# Patient Record
Sex: FEMALE | Race: WHITE | NOT HISPANIC OR LATINO | Employment: OTHER | URBAN - METROPOLITAN AREA
[De-identification: names, ages, dates, MRNs, and addresses within clinical notes are randomized per-mention and may not be internally consistent; named-entity substitution may affect disease eponyms.]

---

## 2023-01-20 ENCOUNTER — HOSPITAL ENCOUNTER (EMERGENCY)
Facility: HOSPITAL | Age: 63
Discharge: HOME | End: 2023-01-20
Attending: EMERGENCY MEDICINE

## 2023-01-20 VITALS
TEMPERATURE: 98.1 F | SYSTOLIC BLOOD PRESSURE: 145 MMHG | WEIGHT: 150 LBS | OXYGEN SATURATION: 100 % | HEART RATE: 95 BPM | RESPIRATION RATE: 18 BRPM | HEIGHT: 68 IN | BODY MASS INDEX: 22.73 KG/M2 | DIASTOLIC BLOOD PRESSURE: 63 MMHG

## 2023-01-20 DIAGNOSIS — R00.2 PALPITATIONS: Primary | ICD-10-CM

## 2023-01-20 LAB
ANION GAP SERPL CALCULATED.3IONS-SCNC: 13 MMOL/L (ref 4–13)
BASOPHILS # BLD AUTO: 0.05 THOUSANDS/ÂΜL (ref 0–0.1)
BASOPHILS NFR BLD AUTO: 1 % (ref 0–1)
BILIRUB UR QL STRIP: NEGATIVE
BUN SERPL-MCNC: 17 MG/DL (ref 5–25)
CALCIUM SERPL-MCNC: 10 MG/DL (ref 8.4–10.2)
CHLORIDE SERPL-SCNC: 99 MMOL/L (ref 96–108)
CLARITY UR: CLEAR
CO2 SERPL-SCNC: 24 MMOL/L (ref 21–32)
COLOR UR: YELLOW
CREAT SERPL-MCNC: 0.73 MG/DL (ref 0.6–1.3)
EOSINOPHIL # BLD AUTO: 0.15 THOUSAND/ÂΜL (ref 0–0.61)
EOSINOPHIL NFR BLD AUTO: 2 % (ref 0–6)
ERYTHROCYTE [DISTWIDTH] IN BLOOD BY AUTOMATED COUNT: 11.9 % (ref 11.6–15.1)
GFR SERPL CREATININE-BSD FRML MDRD: 87 ML/MIN/1.73SQ M
GLUCOSE SERPL-MCNC: 132 MG/DL (ref 65–140)
GLUCOSE UR STRIP-MCNC: NEGATIVE MG/DL
HCT VFR BLD AUTO: 40.1 % (ref 34.8–46.1)
HGB BLD-MCNC: 13.4 G/DL (ref 11.5–15.4)
HGB UR QL STRIP.AUTO: NEGATIVE
IMM GRANULOCYTES # BLD AUTO: 0.02 THOUSAND/UL (ref 0–0.2)
IMM GRANULOCYTES NFR BLD AUTO: 0 % (ref 0–2)
KETONES UR STRIP-MCNC: NEGATIVE MG/DL
LEUKOCYTE ESTERASE UR QL STRIP: NEGATIVE
LYMPHOCYTES # BLD AUTO: 2.26 THOUSANDS/ÂΜL (ref 0.6–4.47)
LYMPHOCYTES NFR BLD AUTO: 26 % (ref 14–44)
MAGNESIUM SERPL-MCNC: 1.8 MG/DL (ref 1.9–2.7)
MCH RBC QN AUTO: 31.9 PG (ref 26.8–34.3)
MCHC RBC AUTO-ENTMCNC: 33.4 G/DL (ref 31.4–37.4)
MCV RBC AUTO: 96 FL (ref 82–98)
MONOCYTES # BLD AUTO: 0.76 THOUSAND/ÂΜL (ref 0.17–1.22)
MONOCYTES NFR BLD AUTO: 9 % (ref 4–12)
NEUTROPHILS # BLD AUTO: 5.53 THOUSANDS/ÂΜL (ref 1.85–7.62)
NEUTS SEG NFR BLD AUTO: 62 % (ref 43–75)
NITRITE UR QL STRIP: NEGATIVE
NRBC BLD AUTO-RTO: 0 /100 WBCS
PH UR STRIP.AUTO: 8 [PH]
PLATELET # BLD AUTO: 228 THOUSANDS/UL (ref 149–390)
PMV BLD AUTO: 9.8 FL (ref 8.9–12.7)
POTASSIUM SERPL-SCNC: 3.4 MMOL/L (ref 3.5–5.3)
PROT UR STRIP-MCNC: NEGATIVE MG/DL
RBC # BLD AUTO: 4.2 MILLION/UL (ref 3.81–5.12)
SODIUM SERPL-SCNC: 136 MMOL/L (ref 135–147)
SP GR UR STRIP.AUTO: 1.01
TSH SERPL DL<=0.05 MIU/L-ACNC: 1.9 UIU/ML (ref 0.45–4.5)
UROBILINOGEN UR QL STRIP.AUTO: 0.2 E.U./DL
WBC # BLD AUTO: 8.77 THOUSAND/UL (ref 4.31–10.16)

## 2023-01-20 RX ORDER — CYCLOBENZAPRINE HCL 10 MG
10 TABLET ORAL 3 TIMES DAILY PRN
COMMUNITY

## 2023-01-20 RX ORDER — ATORVASTATIN CALCIUM 10 MG/1
10 TABLET, FILM COATED ORAL DAILY
COMMUNITY

## 2023-01-20 RX ORDER — LORAZEPAM 2 MG/ML
1 INJECTION INTRAMUSCULAR ONCE
Status: COMPLETED | OUTPATIENT
Start: 2023-01-20 | End: 2023-01-20

## 2023-01-20 RX ADMIN — MAGNESIUM OXIDE TAB 400 MG (241.3 MG ELEMENTAL MG) 800 MG: 400 (241.3 MG) TAB at 12:09

## 2023-01-20 NOTE — ED PROVIDER NOTES
History  Chief Complaint   Patient presents with   • Palpitations     Patient reports palpitations, shortness of breath  Patient reports this happened previously and it passed  HPI      Is a very pleasant, nontoxic, 11-year-old female presents emerged part via EMS secondary to palpitations the chief complaint  Patient was going from her house which is across from [facility] and driving up to the [facility] visit her mother in a an assisted living in the [place] area where she is developed palpitation  Patient describes it as if her "heart was pounding out of her chest" there was also associated shortness of breath with this  Patient pulled over try to take her pulse over her antecubital fossa area (patient is a retired nurse), felt as if her pulse was skipping a beat  No chest pain  G of syncope  Remote history of having thyroid dysfunction with cyst aspirated by [facility]  Unclear when the last time patient had any thyroid studies  Patient summoned 911, was hyperventilating had a carpopedal spasm along with periorbital numbness and tingling  After patient was administered oxygen via oxygen mask not hooked up to oxygen supply her hyperventilation improved  Patient's symptoms improved significantly after administration of Ativan by EMS  Prior to Admission Medications   Prescriptions Last Dose Informant Patient Reported? Taking?   atorvastatin (LIPITOR) 10 mg tablet   Yes Yes   Sig: Take 10 mg by mouth daily   conjugated estrogens (PREMARIN) 0 3 mg tablet   Yes Yes   Si 3 mg daily Patch   cyclobenzaprine (FLEXERIL) 10 mg tablet   Yes Yes   Sig: Take 10 mg by mouth 3 (three) times a day as needed for muscle spasms   progesterone (ENDOMETRIN) 100 MG vaginal insert   Yes Yes   Si mg daily      Facility-Administered Medications: None       History reviewed  No pertinent past medical history      Past Surgical History:   Procedure Laterality Date   • LAPAROSCOPY         History reviewed  No pertinent family history  I have reviewed and agree with the history as documented  E-Cigarette/Vaping     E-Cigarette/Vaping Substances     Social History     Tobacco Use   • Smoking status: Never   • Smokeless tobacco: Never   Substance Use Topics   • Alcohol use: Never   • Drug use: Never       Review of Systems   Constitutional: Negative  HENT: Negative  Eyes: Negative  Respiratory: Positive for shortness of breath  Negative for apnea, choking and chest tightness  Cardiovascular: Positive for palpitations  Negative for chest pain and leg swelling  Gastrointestinal: Negative  Endocrine: Negative  Genitourinary: Negative  Musculoskeletal: Negative  Skin: Negative  Allergic/Immunologic: Negative  Neurological: Negative  Hematological: Negative  Physical Exam  Physical Exam  Vitals and nursing note reviewed  Constitutional:       Appearance: Normal appearance  She is normal weight  HENT:      Head: Normocephalic and atraumatic  Right Ear: External ear normal       Left Ear: External ear normal       Nose: Nose normal       Mouth/Throat:      Mouth: Mucous membranes are moist       Pharynx: Oropharynx is clear  Eyes:      Extraocular Movements: Extraocular movements intact  Conjunctiva/sclera: Conjunctivae normal       Pupils: Pupils are equal, round, and reactive to light  Cardiovascular:      Rate and Rhythm: Normal rate and regular rhythm  Pulses: Normal pulses  Heart sounds: Normal heart sounds  Pulmonary:      Effort: Pulmonary effort is normal       Breath sounds: Normal breath sounds  Abdominal:      General: Abdomen is flat  Bowel sounds are normal    Musculoskeletal:         General: Normal range of motion  Cervical back: Normal range of motion  Skin:     General: Skin is warm  Capillary Refill: Capillary refill takes less than 2 seconds  Neurological:      General: No focal deficit present        Mental Status: She is alert and oriented to person, place, and time  Mental status is at baseline  Psychiatric:         Mood and Affect: Mood normal          Behavior: Behavior normal          Thought Content: Thought content normal          Judgment: Judgment normal          Vital Signs  ED Triage Vitals   Temperature Pulse Respirations Blood Pressure SpO2   01/20/23 1041 01/20/23 1041 01/20/23 1041 01/20/23 1041 01/20/23 1041   98 1 °F (36 7 °C) 104 (!) 25 144/79 100 %      Temp Source Heart Rate Source Patient Position - Orthostatic VS BP Location FiO2 (%)   01/20/23 1041 01/20/23 1215 01/20/23 1215 01/20/23 1215 --   Tympanic Monitor Sitting Left arm       Pain Score       01/20/23 1041       No Pain           Vitals:    01/20/23 1041 01/20/23 1215 01/20/23 1230   BP: 144/79 122/57 145/63   Pulse: 104 96 95   Patient Position - Orthostatic VS:  Sitting          Visual Acuity      ED Medications  Medications   magnesium oxide (MAG-OX) tablet 800 mg (800 mg Oral Given 1/20/23 1209)   LORazepam (FOR EMS ONLY) (ATIVAN) 2 mg/mL injection 2 mg (0 mg Does not apply Given to EMS 1/20/23 1053)       Diagnostic Studies  Results Reviewed     Procedure Component Value Units Date/Time    TSH [[id number]]  (Normal) Collected: 01/20/23 1107    Lab Status: Final result Specimen: Blood from Arm, Left Updated: 01/20/23 1152     TSH 3RD GENERATON 1 901 uIU/mL     Narrative:      Patients undergoing fluorescein dye angiography may retain small amounts of fluorescein in the body for 48-72 hours post procedure  Samples containing fluorescein can produce falsely depressed TSH values  If the patient had this procedure,a specimen should be resubmitted post fluorescein clearance        Basic metabolic panel [[id number]]  (Abnormal) Collected: 01/20/23 1107    Lab Status: Final result Specimen: Blood from Arm, Left Updated: 01/20/23 1136     Sodium 136 mmol/L      Potassium 3 4 mmol/L      Chloride 99 mmol/L      CO2 24 mmol/L      ANION GAP 13 mmol/L      BUN 17 mg/dL      Creatinine 0 73 mg/dL      Glucose 132 mg/dL      Calcium 10 0 mg/dL      eGFR 87 ml/min/1 73sq m     Narrative:      [place] guidelines for Chronic Kidney Disease (CKD):   •  Stage 1 with normal or high GFR (GFR > 90 mL/min/1 73 square meters)  •  Stage 2 Mild CKD (GFR = 60-89 mL/min/1 73 square meters)  •  Stage 3A Moderate CKD (GFR = 45-59 mL/min/1 73 square meters)  •  Stage 3B Moderate CKD (GFR = 30-44 mL/min/1 73 square meters)  •  Stage 4 Severe CKD (GFR = 15-29 mL/min/1 73 square meters)  •  Stage 5 End Stage CKD (GFR <15 mL/min/1 73 square meters)  Note: GFR calculation is accurate only with a steady state creatinine    Magnesium [[id number]]  (Abnormal) Collected: 01/20/23 1107    Lab Status: Final result Specimen: Blood from Arm, Left Updated: 01/20/23 1136     Magnesium 1 8 mg/dL     UA w Reflex to Microscopic w Reflex to Culture [[id number]]  (Abnormal) Collected: 01/20/23 1109    Lab Status: Final result Specimen: Urine, Clean Catch Updated: 01/20/23 1119     Color, UA Yellow     Clarity, UA Clear     Specific Gravity, UA 1 010     pH, UA 8 0     Leukocytes, UA Negative     Nitrite, UA Negative     Protein, UA Negative mg/dl      Glucose, UA Negative mg/dl      Ketones, UA Negative mg/dl      Urobilinogen, UA 0 2 E U /dl      Bilirubin, UA Negative     Occult Blood, UA Negative    CBC and differential [[id number]] Collected: 01/20/23 1107    Lab Status: Final result Specimen: Blood from Arm, Left Updated: 01/20/23 1118     WBC 8 77 Thousand/uL      RBC 4 20 Million/uL      Hemoglobin 13 4 g/dL      Hematocrit 40 1 %      MCV 96 fL      MCH 31 9 pg      MCHC 33 4 g/dL      RDW 11 9 %      MPV 9 8 fL      Platelets 338 Thousands/uL      nRBC 0 /100 WBCs      Neutrophils Relative 62 %      Immat GRANS % 0 %      Lymphocytes Relative 26 %      Monocytes Relative 9 %      Eosinophils Relative 2 %      Basophils Relative 1 %      Neutrophils Absolute 5 53 Thousands/µL      Immature Grans Absolute 0 02 Thousand/uL      Lymphocytes Absolute 2 26 Thousands/µL      Monocytes Absolute 0 76 Thousand/µL      Eosinophils Absolute 0 15 Thousand/µL      Basophils Absolute 0 05 Thousands/µL                  No orders to display              Procedures  ECG 12 Lead Documentation Only    Date/Time: 1/20/2023 10:59 AM  Performed by: Susy Camarillo DO  Authorized by: Nicole Rodriguez III, DO     Indications / Diagnosis:  Palpitations  ECG reviewed by me, the ED Provider: yes    Patient location:  ED  Comments:      I personally reviewed this EKG that was performed on the patient January 20, 2023, EKG was completed at 10:55 AM and interpreted by me at 10:59 AM, normal sinus rhythm with baseline artifact noted, incomplete right bundle branch block noted  Has of prolonged QT at 487 ms  No prior EKGs to compare to  ED Course  ED Course as of 01/20/23 1315   Fri Jan 20, 2023   1106 Patient seen and evaluated, prior history of anxiety, 26-year-old female presents emergency department with potation's and feels like her heart is pounding out of her chest, recently been under a large amount of stress related to the diminishing health of her mother who is currently in assisted living in the [place] area  See HPI for specifics (symptoms improved after Ativan), had carpopedal spasm along with periorbital tingling  Frontal diagnosis in this patient is as follows: Anxiety with hyperventilation vs  thyroid dysfunction: Prior Thyroid nodule  1137 Marginally low Magnesium: 1 8, will replace  1212 Patient reassessed  Exam consistent with palpitations may be underlying ventilation related to anxiety but does not have a formal diagnosis of anxiety disorder  Will refer to cardiology for possible Holter                                  SBIRT 20yo+    Flowsheet Row Most Recent Value   SBIRT (23 yo +)    In order to provide better care to our patients, we are screening all of our patients for alcohol and drug use  Would it be okay to ask you these screening questions? Yes Filed at: 01/20/2023 1113   Initial Alcohol Screen: US AUDIT-C     1  How often do you have a drink containing alcohol? 4 Filed at: 01/20/2023 1113   2  How many drinks containing alcohol do you have on a typical day you are drinking? 2 Filed at: 01/20/2023 1113   3a  Male UNDER 65: How often do you have five or more drinks on one occasion? 0 Filed at: 01/20/2023 1113   3b  FEMALE Any Age, or MALE 65+: How often do you have 4 or more drinks on one occassion? 0 Filed at: 01/20/2023 1113   Audit-C Score 6 Filed at: 01/20/2023 1113   ERNST: How many times in the past year have you    Used an illegal drug or used a prescription medication for non-medical reasons? Never Filed at: 01/20/2023 1113                    Medical Decision Making  Is a very pleasant, nontoxic-appearing, 59-year-old female presents to emergency department with chief complaint of palpitations described as "my heart is pounding out of my chest"  Patient arrived here with symptoms consistent with hyperventilation syndrome secondary to a full-blown anxiety attack  No formal diagnosis of anxiety, does have a history of thyroid dysfunction TSH normal  EKG within normal limits  Patient reports she is under huge amount of stress related to the recent health of her mother where she had multiple ER visits over the holidays with a subsequent admission and transfer to a [facility]  After Ativan symptoms improved, patient had normal pedal spasm, oral facial numbness with hyperventilation which improved with oxygen mask not being placed to wall oxygen        Anticipatory guidance given to the patient regarding need for self care, no arrhthymias on telemetry seen, 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 there was a pause, did not see this on the telemetry monitor, patient will have a monitor ordered as an outpatient and follow-up with cardiology  Patient stable at time of discharge  Portions of the record may have been created with voice recognition software  Occasional wrong word or "sound a like" substitutions may have occurred due to the inherent limitations of voice recognition software  Read the chart carefully and recognize, using context, where substitutions have occurred  Counseling: I had a detailed discussion with the patient and/or guardian regarding: the historical points, exam findings, and any diagnostic results supporting the discharge diagnosis, lab results, radiology results, discharge instructions reviewed with patient and/or family/caregiver and understanding was verbalized  Instructions given to return to the emergency department if symptoms worsen or persist, or if there are any questions or concerns that arise at home      This patient was examined during the Covid-19 pandemic, and appropriate PPE was employed as defined by OSHA to minimize exposure to the patient and to avoid spread in the event that I am an asymptomatic carrier  All efforts were made to avoid direct contact with the patient per CDC guidelines ("social distancing") unless otherwise necessary to rule out a medical emergency and/or to provide life-saving interventions  Donning and doffing of PPE was performed per recommended guidelines, and personal PPE was employed if /when institutional PPE was not readily available or was deemed to be less than the recommended as defined by OSHA  Palpitations: acute illness or injury  Amount and/or Complexity of Data Reviewed  Labs: ordered  ECG/medicine tests: ordered  Risk  OTC drugs  Prescription drug management            Disposition  Final diagnoses:   Palpitations     Time reflects when diagnosis was documented in both MDM as applicable and the Disposition within this note     Time User Action Codes Description Comment    1/20/2023 12:29 PM Art Hayden Add [R00 2] Palpitations       ED Disposition     ED Disposition   Discharge    Condition   Stable    Date/Time   Fri Jan 20, 2023 12:30 PM    Comment   Renan Paz discharge to home/self care  Follow-up Information     Follow up With Specialties Details Why [address] Blunt DO Cardiology   [facility]  Dena Banerjee [phone] [facility]            Discharge Medication List as of 1/20/2023 12:31 PM      CONTINUE these medications which have NOT CHANGED    Details   atorvastatin (LIPITOR) 10 mg tablet Take 10 mg by mouth daily, Historical Med      conjugated estrogens (PREMARIN) 0 3 mg tablet 0 3 mg daily Patch, Historical Med      cyclobenzaprine (FLEXERIL) 10 mg tablet Take 10 mg by mouth 3 (three) times a day as needed for muscle spasms, Historical Med      progesterone (ENDOMETRIN) 100 MG vaginal insert 100 mg daily, Historical Med             Outpatient Discharge Orders   Ambulatory Referral to Cardiology   Standing Status: Future Standing Exp  Date: 01/20/24      Holter monitor   Standing Status: Future Standing Exp   Date: 01/20/27       PDMP Review     None          ED Provider  Electronically Signed by           Domenico Varner III, DO  01/20/23 5880

## 2023-01-21 LAB
ATRIAL RATE: 100 BPM
P AXIS: 85 DEGREES
PR INTERVAL: 160 MS
QRS AXIS: 51 DEGREES
QRSD INTERVAL: 112 MS
QT INTERVAL: 378 MS
QTC INTERVAL: 487 MS
T WAVE AXIS: 77 DEGREES
VENTRICULAR RATE: 100 BPM

## 2023-01-24 PROBLEM — E78.2 MIXED HYPERLIPIDEMIA: Status: ACTIVE | Noted: 2022-11-22

## 2023-01-24 PROBLEM — E04.1 THYROID NODULE: Status: ACTIVE | Noted: 2018-06-21

## 2023-01-24 PROBLEM — M54.50 INTERMITTENT LOW BACK PAIN: Status: ACTIVE | Noted: 2019-07-10

## 2023-01-24 PROBLEM — D69.6 THROMBOCYTOPENIA (HCC): Status: ACTIVE | Noted: 2023-01-24

## 2023-01-24 PROBLEM — M20.20 ACQUIRED HALLUX RIGIDUS: Status: ACTIVE | Noted: 2021-04-14

## 2023-01-24 NOTE — PROGRESS NOTES
Cardiology Consultation     Kathryn Bryan  [id number]  1960  [facility] CARDIOLOGY [place]  [address] [id number]      1  Palpitations  Ambulatory Referral to Cardiology    AMB extended holter monitor    Echo complete w/ contrast if indicated      2  Mixed hyperlipidemia            Discussion/Summary:  Palpitations  -2 episodes of palpitations last week  - No concerning findings and EKG  - TSH WNL, and no other concerning findings on blood work  - Will check 2-week Zio patch, if negative, can consider repeat Zio patch, versus loop recorder versus Kardia mobile  - Instructed patient to call if she has frequent palpitations, can consider starting metoprolol at that time  - Also check TTE to rule out structural causes of her palpitations/chest pounding  Hyperlipidemia  - Continue with atorvastatin 10 mg daily  - Lipid panel 11/15/2022 showed total cholesterol 230, HDL 57, triglycerides 160,   -Cholesterol elevated, will continue to monitor for now    History of Present Illness:  Kathryn Bryan is a 60y o  year old female with a past medical history of hyperlipidemia  She presented to the emergency department on 1/20/2023 for palpitations and shortness of breath  Her symptoms improved after Ativan given by EMS  She reports initially feeling palpitations while driving to see her mother  Started initially with a fluttering sensation that lasted a few seconds followed by a pause and then reports feeling a strong pounding sensation in her chest   Denies any episodes of lightheadedness/dizziness or passing out with the palpitations or pounding  Does report feeling stiffness and numbness/tingling in her hands  She was unsure how to get to the nearest hospital, so she called EMS who brought her to the ED  She reports having similar episode 2 days prior at night when she woke up to use the bathroom  She reports she was helpful back to sleep and the symptoms went away    He also does report having 1 prior episode of palpitations about a year ago while cleaning her house, which also subsided on their own  Has any prior cardiac history  Reports having 1 cup of half caf coffee in the morning  Acknowledges drinking some water throughout the day, but probably less than recommended  Patient Active Problem List   Diagnosis   • Acquired hallux rigidus   • Intermittent low back pain   • Postmenopausal hormone replacement therapy   • Mixed hyperlipidemia   • Personal history of malignant neoplasm of skin   • Thyroid nodule   • Vaginal atrophy   • Thrombocytopenia ([facility] [address] )     History reviewed  No pertinent past medical history  Social History     Socioeconomic History   • Marital status: /Civil Union     Spouse name: Not on file   • Number of children: Not on file   • Years of education: Not on file   • Highest education level: Not on file   Occupational History   • Not on file   Tobacco Use   • Smoking status: Never   • Smokeless tobacco: Never   Substance and Sexual Activity   • Alcohol use: Never   • Drug use: Never   • Sexual activity: Not on file   Other Topics Concern   • Not on file   Social History Narrative   • Not on file     Social Determinants of Health     Financial Resource Strain: Not on file   Food Insecurity: Not on file   Transportation Needs: Not on file   Physical Activity: Not on file   Stress: Not on file   Social Connections: Not on file   Intimate Partner Violence: Not on file   Housing Stability: Not on file      History reviewed  No pertinent family history    Past Surgical History:   Procedure Laterality Date   • LAPAROSCOPY         Current Outpatient Medications:   •  atorvastatin (LIPITOR) 10 mg tablet, Take 10 mg by mouth daily, Disp: , Rfl:   •  Cholecalciferol 50 MCG (2000 UT) CAPS, Take 1 capsule by mouth in the morning, Disp: , Rfl:   •  cyclobenzaprine (FLEXERIL) 10 mg tablet, Take 10 mg by mouth 3 (three) times a day as needed for muscle spasms, Disp: , Rfl: •  estradiol (VIVELLE-DOT) 0 0375 MG/24HR, Place on the skin, Disp: , Rfl:   •  fluticasone (FLONASE) 50 mcg/act nasal spray, into each nostril, Disp: , Rfl:   •  gabapentin (NEURONTIN) 100 mg capsule, Take 100 mg by mouth daily, Disp: , Rfl:   •  Multiple Vitamins-Minerals (MULTIVITAL PO), Take 1 tablet by mouth daily, Disp: , Rfl:   •  Progesterone 100 MG CAPS, , Disp: , Rfl:   No Known Allergies      Labs:  Lab Results   Component Value Date    BUN 17 2023    CALCIUM 10 0 2023    CL 99 2023    CO2 24 2023    CREATININE 0 73 2023    HCT 40 1 2023    HGB 13 4 2023    HGBA1C 5 4 11/15/2022    MG 1 8 (L) 2023     2023    K 3 4 (L) 2023    WBC 8 77 2023       Imaging: No results found  EC2023 sinus tachycardia at heart rate of 100 bpm, incomplete RBBB    Review of Systems:  Review of Systems   Constitutional: Negative for chills, diaphoresis, fatigue and fever  HENT: Negative for congestion  Eyes: Negative for photophobia and visual disturbance  Respiratory: Negative for chest tightness and shortness of breath  Cardiovascular: Positive for palpitations  Negative for chest pain and leg swelling  Gastrointestinal: Negative for abdominal distention, abdominal pain, diarrhea, nausea and vomiting  Genitourinary: Negative for difficulty urinating and dysuria  Musculoskeletal: Negative for arthralgias, gait problem and joint swelling  Skin: Negative for color change, pallor and rash  Neurological: Negative for dizziness, syncope, numbness and headaches  Psychiatric/Behavioral: Negative for agitation, behavioral problems and confusion           Vitals:    23 0940   BP: 110/60   Pulse: 67   SpO2: 99%      Vitals:    23 0940   Weight: 67 kg (147 lb 9 6 oz)     Height: 5' 8" (172 7 cm)     Physical Exam:  General appearance:  Appears stated age, alert, well appearing and in no distress  HEENT:  PERRLA, EOMI, no scleral icterus, no conjunctival pallor  NECK:  Supple, No elevated JVP, no thyromegaly, no carotid bruits  HEART: Tachycardic, regular rhythm, normal S1/S2, no S3/S4, no murmur or rub  LUNGS:  Clear to auscultation bilaterally  ABDOMEN:  Soft, non-tender, positive bowel sounds, no rebound or guarding, no organomegaly   EXTREMITIES:  No edema  VASCULAR:  Normal pedal pulses   SKIN: No lesions or rashes on exposed skin  NEURO:  CN II-XII intact, no focal deficits

## 2023-01-25 ENCOUNTER — CONSULT (OUTPATIENT)
Dept: CARDIOLOGY CLINIC | Facility: CLINIC | Age: 63
End: 2023-01-25

## 2023-01-25 VITALS
BODY MASS INDEX: 22.37 KG/M2 | DIASTOLIC BLOOD PRESSURE: 60 MMHG | OXYGEN SATURATION: 99 % | HEIGHT: 68 IN | SYSTOLIC BLOOD PRESSURE: 110 MMHG | WEIGHT: 147.6 LBS | HEART RATE: 67 BPM

## 2023-01-25 DIAGNOSIS — E78.2 MIXED HYPERLIPIDEMIA: ICD-10-CM

## 2023-01-25 DIAGNOSIS — R00.2 PALPITATIONS: Primary | ICD-10-CM

## 2023-01-25 RX ORDER — ESTRADIOL 0.04 MG/D
FILM, EXTENDED RELEASE TRANSDERMAL
COMMUNITY
Start: 2023-01-20

## 2023-01-25 RX ORDER — FLUTICASONE PROPIONATE 50 MCG
SPRAY, SUSPENSION (ML) NASAL
COMMUNITY

## 2023-01-25 RX ORDER — PROGESTERONE 100 MG/1
CAPSULE ORAL
COMMUNITY
Start: 2022-10-29

## 2023-01-25 RX ORDER — GABAPENTIN 100 MG/1
100 CAPSULE ORAL DAILY
COMMUNITY
Start: 2022-11-16

## 2023-03-02 ENCOUNTER — CLINICAL SUPPORT (OUTPATIENT)
Dept: CARDIOLOGY CLINIC | Facility: CLINIC | Age: 63
End: 2023-03-02

## 2023-03-02 DIAGNOSIS — R00.2 PALPITATIONS: ICD-10-CM
